# Patient Record
Sex: MALE | Race: WHITE | URBAN - METROPOLITAN AREA
[De-identification: names, ages, dates, MRNs, and addresses within clinical notes are randomized per-mention and may not be internally consistent; named-entity substitution may affect disease eponyms.]

---

## 2019-04-07 ENCOUNTER — ANCILLARY PROCEDURE (OUTPATIENT)
Dept: GENERAL RADIOLOGY | Facility: CLINIC | Age: 29
End: 2019-04-07
Attending: FAMILY MEDICINE
Payer: COMMERCIAL

## 2019-04-07 ENCOUNTER — OFFICE VISIT (OUTPATIENT)
Dept: ORTHOPEDICS | Facility: CLINIC | Age: 29
End: 2019-04-07
Payer: COMMERCIAL

## 2019-04-07 VITALS
HEART RATE: 64 BPM | SYSTOLIC BLOOD PRESSURE: 158 MMHG | HEIGHT: 72 IN | WEIGHT: 189.4 LBS | DIASTOLIC BLOOD PRESSURE: 67 MMHG | BODY MASS INDEX: 25.65 KG/M2

## 2019-04-07 DIAGNOSIS — S99.921A INJURY OF RIGHT FOOT, INITIAL ENCOUNTER: ICD-10-CM

## 2019-04-07 DIAGNOSIS — S99.921A INJURY OF RIGHT FOOT, INITIAL ENCOUNTER: Primary | ICD-10-CM

## 2019-04-07 ASSESSMENT — MIFFLIN-ST. JEOR: SCORE: 1877.11

## 2019-04-07 NOTE — LETTER
4/7/2019       RE: Aashish Workman  [address]  [facility] [address]     Dear Colleague,    Thank you for referring your patient, Aashish Workman, to the [facility] SPORTS AND ORTHOPAED[facility] WALK IN CLINIC at [facility]. Please see a copy of my visit note below.          SPORTS & ORTHOPEDIC WALK-IN VISIT 4/7/2019    Primary Care Physician: Dr. Dunn    He states that he kicked a tree last night.     Reason for visit:     What part of your body is injured / painful?  right foot    What caused the injury /pain? Kicked a tree    How long ago did your injury occur or pain begin? yesterday    What are your most bothersome symptoms? Pain, swelling, limited ROM    How would you characterize your symptom?  nagging    What makes your symptoms better? Nothing    What makes your symptoms worse? Walking, weightbearing     Have you been previously seen for this problem? No    Medical History:    Any recent changes to your medical history? No    Any new medication prescribed since last visit? No    Have you had surgery on this body part before? No    Social History:    Occupation:     Handedness: Right    Exercise: Lifting, 3x per week    Review of Systems:    Do you have fever, chills, weight loss? No    Do you have any vision problems? No    Do you have any chest pain or edema? No    Do you have any shortness of breath or wheezing?  No    Do you have stomach problems? No    Do you have any numbness or focal weakness? No    Do you have diabetes? No    Do you have problems with bleeding or clotting? No    Do you have an rashes or other skin lesions? No       HPI    Mr. Workman is a pleasant 28 year old year old male who presents to orthopedic walk in clinic today with concerns of acute onset right foot pain.  Aashish explains that he was celebrating the final 4 last night when he kicked a tree with the dorsal aspect of his right foot for a reason that is still not completely  understood by me.  He did not endorse having any immediate symptoms following taking the tree but today is experiencing pain and swelling.  He is able to ambulate on the foot.  No history of prior foot injuries.  He denies any ankle pain or ankle swelling.  No numbness or tingling.  He has not noticed any obvious deformities.  Medical History     Denies significant medical or surgical history other than noted above.       Allergies   Allergen Reactions     Penicillins        Social History     Socioeconomic History     Marital status: Single     Spouse name: Not on file     Number of children: Not on file     Years of education: Not on file     Highest education level: Not on file   Occupational History     Not on file   Social Needs     Financial resource strain: Not on file     Food insecurity:     Worry: Not on file     Inability: Not on file     Transportation needs:     Medical: Not on file     Non-medical: Not on file   Tobacco Use     Smoking status: Current Some Day Smoker     Smokeless tobacco: Never Used   Substance and Sexual Activity     Alcohol use: Not on file     Drug use: Not on file     Sexual activity: Not on file   Lifestyle     Physical activity:     Days per week: Not on file     Minutes per session: Not on file     Stress: Not on file   Relationships     Social connections:     Talks on phone: Not on file     Gets together: Not on file     Attends Shinto service: Not on file     Active member of club or organization: Not on file     Attends meetings of clubs or organizations: Not on file     Relationship status: Not on file     Intimate partner violence:     Fear of current or ex partner: Not on file     Emotionally abused: Not on file     Physically abused: Not on file     Forced sexual activity: Not on file   Other Topics Concern     Not on file   Social History Narrative     Not on file       No family history on file.    No current outpatient medications on file.     No current  facility-administered medications for this visit.        Objective Findings     Vitals:    04/07/19 1319   BP: 158/67   Pulse: 64   Weight: 85.9 kg (189 lb 6.4 oz)   Height: 1.829 m (6')       Physical Exam:  Gen: A&O in NAD   CV: extremities well perfused x4  Pulm: without respiratory distress  Derm: no rashes or lesions  Psych: normal affect and speech  Gait: Favoring right foot    - RIGHT FOOT :  No erythema, ecchymosis, or obvious deformity.  There is mild dorsal lateral midfoot swelling.  He has mild tenderness palpation over the lateral cuneiform and cuboid.  He is nontender over the first through third metatarsals and nontender over the base of the fifth.  He is also nontender over the navicular or medial or lateral malleolus.  There is no tenderness over the ATFL, CFL, or PTFL.  He has full strength and range of motion of both the foot as well as the ankle.  He has intact distal sensation and pedal pulses.    -3 view x-ray of the right foot was obtained today and independently reviewed by me with the following findings:  No evidence of obvious fracture or bony malalignment    Assessment / Plan     #) Right foot pain following kicking a tree, suspect mid-foot contusion    -Findings reviewed with the patient as above including x-rays  -There is no evidence of fracture and patient is able to ambulate quite well with minimal discomfort  -Discussed options including brief period of immobilization in a cam boot or postop shoe  -Patient deferred immobilization  -He will therefore continue to ambulate as tolerated and if symptoms do not improve as expected in a contusion, he will follow-up with me at which time I would recommend a brief period of immobilization and follow-up imaging in 1 week to rule out occult fracture    Patient endorsed understanding and agreement with the above plan    Mu Naylor MD  Primary [facility] Medicine, [place]

## 2019-04-07 NOTE — PROGRESS NOTES
SPORTS & ORTHOPEDIC WALK-IN VISIT 4/7/2019    Primary Care Physician: Dr. Dunn    He states that he kicked a tree last night.     Reason for visit:     What part of your body is injured / painful?  right foot    What caused the injury /pain? Kicked a tree    How long ago did your injury occur or pain begin? yesterday    What are your most bothersome symptoms? Pain, swelling, limited ROM    How would you characterize your symptom?  nagging    What makes your symptoms better? Nothing    What makes your symptoms worse? Walking, weightbearing     Have you been previously seen for this problem? No    Medical History:    Any recent changes to your medical history? No    Any new medication prescribed since last visit? No    Have you had surgery on this body part before? No    Social History:    Occupation:     Handedness: Right    Exercise: Lifting, 3x per week    Review of Systems:    Do you have fever, chills, weight loss? No    Do you have any vision problems? No    Do you have any chest pain or edema? No    Do you have any shortness of breath or wheezing?  No    Do you have stomach problems? No    Do you have any numbness or focal weakness? No    Do you have diabetes? No    Do you have problems with bleeding or clotting? No    Do you have an rashes or other skin lesions? No       HPI    Mr. Workman is a pleasant 28 year old year old male who presents to orthopedic walk in clinic today with concerns of acute onset right foot pain.  Aashish explains that he was celebrating the final 4 last night when he kicked a tree with the dorsal aspect of his right foot for a reason that is still not completely understood by me.  He did not endorse having any immediate symptoms following taking the tree but today is experiencing pain and swelling.  He is able to ambulate on the foot.  No history of prior foot injuries.  He denies any ankle pain or ankle swelling.  No numbness or tingling.  He has not noticed any obvious  deformities.      Medical History     Denies significant medical or surgical history other than noted above.       Allergies   Allergen Reactions     Penicillins        Social History     Socioeconomic History     Marital status: Single     Spouse name: Not on file     Number of children: Not on file     Years of education: Not on file     Highest education level: Not on file   Occupational History     Not on file   Social Needs     Financial resource strain: Not on file     Food insecurity:     Worry: Not on file     Inability: Not on file     Transportation needs:     Medical: Not on file     Non-medical: Not on file   Tobacco Use     Smoking status: Current Some Day Smoker     Smokeless tobacco: Never Used   Substance and Sexual Activity     Alcohol use: Not on file     Drug use: Not on file     Sexual activity: Not on file   Lifestyle     Physical activity:     Days per week: Not on file     Minutes per session: Not on file     Stress: Not on file   Relationships     Social connections:     Talks on phone: Not on file     Gets together: Not on file     Attends Religion service: Not on file     Active member of club or organization: Not on file     Attends meetings of clubs or organizations: Not on file     Relationship status: Not on file     Intimate partner violence:     Fear of current or ex partner: Not on file     Emotionally abused: Not on file     Physically abused: Not on file     Forced sexual activity: Not on file   Other Topics Concern     Not on file   Social History Narrative     Not on file       No family history on file.    No current outpatient medications on file.     No current facility-administered medications for this visit.            Objective Findings     Vitals:    04/07/19 1319   BP: 158/67   Pulse: 64   Weight: 85.9 kg (189 lb 6.4 oz)   Height: 1.829 m (6')         Physical Exam:  Gen: A&O in NAD   CV: extremities well perfused x4  Pulm: without respiratory distress  Derm: no rashes  or lesions  Psych: normal affect and speech  Gait: Favoring right foot    - RIGHT FOOT :  No erythema, ecchymosis, or obvious deformity.  There is mild dorsal lateral midfoot swelling.  He has mild tenderness palpation over the lateral cuneiform and cuboid.  He is nontender over the first through third metatarsals and nontender over the base of the fifth.  He is also nontender over the navicular or medial or lateral malleolus.  There is no tenderness over the ATFL, CFL, or PTFL.  He has full strength and range of motion of both the foot as well as the ankle.  He has intact distal sensation and pedal pulses.    -3 view x-ray of the right foot was obtained today and independently reviewed by me with the following findings:  No evidence of obvious fracture or bony malalignment        Assessment / Plan     #) Right foot pain following kicking a tree, suspect mid-foot contusion    -Findings reviewed with the patient as above including x-rays  -There is no evidence of fracture and patient is able to ambulate quite well with minimal discomfort  -Discussed options including brief period of immobilization in a cam boot or postop shoe  -Patient deferred immobilization  -He will therefore continue to ambulate as tolerated and if symptoms do not improve as expected in a contusion, he will follow-up with me at which time I would recommend a brief period of immobilization and follow-up imaging in 1 week to rule out occult fracture      Patient endorsed understanding and agreement with the above plan    Mu Naylor MD  Primary Care Sports Medicine, [facility]